# Patient Record
Sex: FEMALE | Race: NATIVE HAWAIIAN OR OTHER PACIFIC ISLANDER | NOT HISPANIC OR LATINO | Employment: UNEMPLOYED | URBAN - METROPOLITAN AREA
[De-identification: names, ages, dates, MRNs, and addresses within clinical notes are randomized per-mention and may not be internally consistent; named-entity substitution may affect disease eponyms.]

---

## 2018-06-28 ENCOUNTER — HOSPITAL ENCOUNTER (EMERGENCY)
Facility: MEDICAL CENTER | Age: 63
End: 2018-06-28
Attending: EMERGENCY MEDICINE
Payer: MEDICAID

## 2018-06-28 VITALS
OXYGEN SATURATION: 97 % | HEART RATE: 65 BPM | BODY MASS INDEX: 32.21 KG/M2 | WEIGHT: 200.4 LBS | HEIGHT: 66 IN | DIASTOLIC BLOOD PRESSURE: 105 MMHG | TEMPERATURE: 97.6 F | SYSTOLIC BLOOD PRESSURE: 129 MMHG | RESPIRATION RATE: 16 BRPM

## 2018-06-28 DIAGNOSIS — M79.89 LEFT ARM SWELLING: Primary | ICD-10-CM

## 2018-06-28 PROCEDURE — 93990 DOPPLER FLOW TESTING: CPT

## 2018-06-28 PROCEDURE — 36415 COLL VENOUS BLD VENIPUNCTURE: CPT

## 2018-06-28 PROCEDURE — 99284 EMERGENCY DEPT VISIT MOD MDM: CPT

## 2018-06-28 RX ORDER — ATENOLOL 50 MG/1
50 TABLET ORAL DAILY
COMMUNITY

## 2018-06-28 RX ORDER — SEVELAMER HYDROCHLORIDE 800 MG/1
800 TABLET, FILM COATED ORAL
COMMUNITY

## 2018-06-28 ASSESSMENT — ENCOUNTER SYMPTOMS
VOMITING: 0
DIZZINESS: 0
HEADACHES: 0
FEVER: 0
NAUSEA: 0
CHILLS: 0
TINGLING: 0

## 2018-06-29 ASSESSMENT — ENCOUNTER SYMPTOMS
SHORTNESS OF BREATH: 0
COUGH: 0

## 2018-06-29 NOTE — ED PROVIDER NOTES
ED Provider Note    Scribed for EMILIANA Nevarez II* by Armand Jones. 6/28/2018  8:29 PM    Means of Arrival: walk in   History obtained by: patient   Limitations: none     CHIEF COMPLAINT  Chief Complaint   Patient presents with   • Vascular Access Problem     Pt had left arm fistula created 1 month in [place].  Positve bruit and thrill, although thrill is faint.  Swelling to that area.    • Arm Swelling     Left arm swelling and redness to left forearm/hand extending into upper arm.        HPI  Hien Adams is a 63 y.o. female who presents with left arm swelling secondary to fistula. She reports that she had a fistula in her left arm about a month ago and she has had gradual swelling of the left arm over past few weeks. No acute worsening in past few days. No pain.  She reports that she has an old fistula in the right arm that is no longer used. She reports that she just moved her from [place] and has not been seen by a physician for this. Hien reports that she goes to her dialysis appointments on M, W, F and so her last one was yesterday and next is tomorrow. She denies any active bleeding, bruising, nausea, vomiting, fever, weakness, dizziness, or pain at the sights of her fistulas.     REVIEW OF SYSTEMS  Review of Systems   Constitutional: Negative for chills and fever.   Respiratory: Negative for cough and shortness of breath.    Cardiovascular: Negative for chest pain.   Gastrointestinal: Negative for nausea and vomiting.   Skin: Negative for itching and rash.        No active bleeding and no bruising   Neurological: Negative for dizziness, tingling and headaches.     See HPI for further details.    PAST MEDICAL HISTORY   has a past medical history of Diabetes (HCC) and Hypertension.    SOCIAL HISTORY  Social History     Social History Main Topics   • Smoking status: Never Smoker   • Smokeless tobacco: Never Used   • Alcohol use No   • Drug use: No   • Sexual activity: Not on file       SURGICAL  "HISTORY   has a past surgical history that includes other cardiac surgery.    CURRENT MEDICATIONS  No current facility-administered medications on file prior to encounter.      No current outpatient prescriptions on file prior to encounter.     ALLERGIES  No Known Allergies    PHYSICAL EXAM  VITAL SIGNS: /105   Pulse 89   Temp 36.4 °C (97.6 °F)   Resp 14   Ht 1.676 m (5' 6\")   Wt 90.9 kg (200 lb 6.4 oz)   SpO2 96%   BMI 32.35 kg/m²     Pulse ox interpretation: I interpret this pulse ox as normal.  Constitutional: Alert in no apparent distress. Obese pleasant women.   HENT: No signs of trauma, Bilateral external ears normal, Nose normal.   Eyes: Pupils are equal, Conjunctiva normal, Non-icteric.   Neck: Normal range of motion, No tenderness, Supple, No stridor.   Cardiovascular: Regular rate and rhythm, no murmurs. Symmetric distal pulses. No cyanosis of extremities. Right arm old fistula no longer with palpable thrill. More recent fistula at left proxiaml inner arm with good thrill. Arm has pitting edema diffusely. Nontender. No warmth. Mild erythema (she says this has been unchanged for over 2 weeks) Right upper chest is a dialysis port.   Thorax & Lungs: Normal breath sounds, No respiratory distress, No wheezing, No chest tenderness.   Abdomen: Bowel sounds normal, Soft, No tenderness, No masses, No pulsatile masses. No peritoneal signs.  Skin: Warm, Dry, No erythema, No rash.   Back: No midline bony tenderness, No CVA tenderness.   Musculoskeletal: Good range of motion in all major joints. No tenderness to palpation or major deformities noted.   Neurologic: Alert , Normal motor function, Normal sensory function, No focal deficits noted.   Psychiatric: Affect normal, Judgment normal, Mood normal.     DIAGNOSTIC STUDIES / PROCEDURES    RADIOLOGY    Pertinent Labs & Imaging studies reviewed. (See chart for details)    COURSE & MEDICAL DECISION MAKING  Pertinent Labs & Imaging studies reviewed. (See " chart for details)    8:29 PM This is a 63 y.o. female who presents with swelling near her left fisula and the differential diagnosis includes but is not limited to DVT, clotted fistula exam not consistent with infection. Ordered for hemodialysis access duplex and US venous duplex to evaluate. She was made aware of her plan of care and was receptive to the plan.     11:02 PM  US shows no signs of thrombosis, good flow through fistula. Went over history again and she reports minimal change in appearance over past several days. No fevers. No pain on re-examination. I have low suspicion for infection.  I have provided her with vascular surgery contact information for follow up.     The patient will return for worsening symptoms and is stable at the time of discharge. The patient verbalizes understanding and will comply.      FINAL IMPRESSION  (M79.89) Left arm swelling  (primary encounter diagnosis)         Armand LIMON (Scribe), am scribing for, and in the presence of, MARVIN Nevarez II.    Electronically signed by: Armand Jones (Flavia), 6/28/2018    Aedlfo LIMON II, M* personally performed the services described in this documentation, as scribed by Armand Jones in my presence, and it is both accurate and complete.    The note accurately reflects work and decisions made by me.  Adelfo Booth II  6/29/2018  3:31 AM

## 2018-06-29 NOTE — ED TRIAGE NOTES
Chief Complaint   Patient presents with   • Vascular Access Problem     Pt had left arm fistula created 1 month in [place].  Positve bruit and thrill, although thrill is faint.  Swelling to that area.    • Arm Swelling     Left arm swelling and redness to left forearm/hand extending into upper arm.    Pt to triage in [facility].  Pt educated on triage process and instructed to notify triage RN of any change in status.

## 2018-06-29 NOTE — ED NOTES
Pt discharged, discharge instructions given. Verbalizes understanding. VSS on RA. All belongings accounted for. Pt ambulated with steady gait to ED lobby.

## 2018-06-29 NOTE — DISCHARGE INSTRUCTIONS
If you develop fever, pain or other serious concerns please come back to the ER for evaluation.     See follow up information for vascular surgeon.     Ultrasound results are below:            Hemodialysis Access Report     Vascular Laboratory   Conclusions       MAGGIE CONLEY     Exam Date:     2018 21:11     Room #:     Inpatient     Priority:     Routine     Ht (in):             Wt (lb):     Ordering Physician:        GIOVANNY QUEVEDO II     Referring Physician:     Sonographer:               Deshawn Best RDCS, RVT     Study Type:     Technical Quality:         Adequate     Age:    63    Gender:     F     MRN:    [id number]     :    1955      BSA:     Indications:     Localized swelling, mass and lump, unspecified     CPT Codes:       57084     ICD Codes:       R229     History:         Had left arm dialysis graft placed 6 weeks ago from brachial                       artery at the antecubital level to the axillary vein. Has   had                     swelling of the right arm since that placement.     Limitations:     Exam Data:   Side:          Left         Access          Graft   Inflow Artery   Brachial   Outflow Vein    Axillary                        Velocity (cm/s)    Prox Inflow artery    Mid Inflow artery    Distal Inflow artery    Inflow Anastomosis    Inflow Artery distal to     anastomosis        Proximal Graft    Mid Graft    Distal Graft    Outflow Anastomosis    Proximal Outflow Vein    Mid Outflow Vein    Distal Outflow Vein    Flow Volume Mid Bicep            ml/min    Flow Volume Mid-Forearm          ml/min    Prox Outflow Vein Diam            cm    Mid Outflow Vein Diam             cm    Distal Outflow Vein Diam          cm     Findings   No evidence of venous thrombosis within the left internal jugular,    subclavian, axillary, or brachial veins.   Normal arterial inflow of the left arm.   Dialysis fistula is patent with  no stenosis within the graft or at the    distal anastomosis.    Inflow velocity to the graft is 323 cm/sec with normal waveform contour.   Volume of flow through the graft is 1287 ml/min.   Flow is noted to be retrograde through the brachial vein of the upper arm    that is likely due to the elevated central venous pressure of the A-V    graft.